# Patient Record
Sex: FEMALE | Race: WHITE
[De-identification: names, ages, dates, MRNs, and addresses within clinical notes are randomized per-mention and may not be internally consistent; named-entity substitution may affect disease eponyms.]

---

## 2018-05-09 ENCOUNTER — HOSPITAL ENCOUNTER (OUTPATIENT)
Dept: HOSPITAL 92 - SCSMRI | Age: 53
Discharge: HOME | End: 2018-05-09
Attending: FAMILY MEDICINE
Payer: COMMERCIAL

## 2018-05-09 DIAGNOSIS — G62.9: Primary | ICD-10-CM

## 2018-05-09 DIAGNOSIS — R90.82: ICD-10-CM

## 2018-05-09 DIAGNOSIS — M99.53: ICD-10-CM

## 2018-05-09 PROCEDURE — 72141 MRI NECK SPINE W/O DYE: CPT

## 2018-05-09 PROCEDURE — 72148 MRI LUMBAR SPINE W/O DYE: CPT

## 2018-05-09 PROCEDURE — 70551 MRI BRAIN STEM W/O DYE: CPT

## 2018-05-09 NOTE — MRI
BRAIN MRI WITHOUT CONTRAST:

 

Date:  05/09/18 

 

HISTORY:  

Polyneuropathy. Patient had an EGD test and failed. Evaluate for multiple sclerosis. 

 

COMPARISON:  

None. 

 

TECHNIQUE:  

Brain MRI is performed without intravenous Gadolinium administration. Multisequential, multiplanar im
aging is performed. 

 

FINDINGS:

No hemorrhage on the axial gradient echo sequence. 

 

Calvarium has a normal marrow signal intensity. Midline brain parenchymal structures are unremarkable
. 

 

Central arterial flow-voids are maintained. Absent restricted diffusion. 

 

There are T2 and FLAIR white matter hyperintensities which are nonspecific. Some of these hyperintens
ities are slightly perpendicular to the ventricular system and may represent demyelinating plagues. A
dditional hyperintensities may be due to chronic small vessel ischemic change. Correlate clinically w
ith laboratory values for multiple sclerosis. Lack of contrast limits evaluation for possible active 
demyelination. Again, there is no evidence of restricted diffusion to suggest active demyelination. N
o evidence of hydrocephalus. No parenchymal mass, mass effect, or midline shift. Brain volume is age-
appropriate. Cortical gray-white matter differentiation is preserved. 

 

IMPRESSION: 

White matter hyperintensities which are nonspecific. Possibility of demyelinating plaques cannot be e
xcluded. Clinical correlation for multiple sclerosis is recommended. 

 

 

POS: [facility]

## 2018-05-09 NOTE — MRI
LUMBAR SPINE MRI WITHOUT IV CONTRAST:

 

HISTORY:

A 52-year-old female with a history of polyneuropathy.  Failed an EGD test, with concern for multiple
 sclerosis and spinal stenosis.

 

TECHNIQUE:

Multiplanar, multisequence MRI examination of the lumbar spine is performed.

 

FINDINGS:

No abnormal marrow signal.  Mild generalized desiccation changes and ligament and facet hypertrophic 
changes.  The conus medullaris region is unremarkable, terminating at L1-L2.

 

T12-L1:  Unremarkable.

L1-L2:   Unremarkable.

L2-L3:   Unremarkable.

L3-L4:   Unremarkable.

L4-L5:   Unremarkable.

L5-S1:   Prominent left-sided facet arthrosis and ligamentous hypertrophic changes, resulting in some
 moderate to severe left foraminal stenosis and mild left lateral recess narrowing.

 

IMPRESSION:

1.  Left-sided facet hypertrophy and ligamentous hypertrophic changes with left foraminal stenosis an
d mild left lateral recess stenosis.  No evidence for other significant acute process.

 

POS: [facility]

## 2018-05-09 NOTE — MRI
MRI CERVICAL SPINE WITHOUT IV CONTRAST:

 

05/09/2018

 

HISTORY:

Polyneuropathy.

 

TECHNIQUE:

Multiplanar, multisequence MR imagines of the cervical spine are obtained without IV contrast.

 

FINDINGS:

There are a few punctate increased T2 weighted signal abnormalities seen in the right periventricular
 white matter, in the parietal region, which are incompletely evaluated on this examination.  Finding
s are better evaluated on MRI brain also obtained on this date (please see that exam for further deta
ils).

 

The cervicomedullary junction demonstrates normal signal intensity.  The spinal cord is also normal i
n contour and signal intensity.  There is motion artifact on axial images, which does limit evaluatio
n, but there is certainly no T2 signal abnormality seen on sagittal imaging.

 

C2-C3:  There is no disk bulge or disk herniation.  The central spinal canal and neural foramina are 
patent.

 

C3-C4:  There is no disk bulge or disk herniation.  The central spinal canal and neural foramina are 
patent.

 

C4-C5:  There is a mild broad-based disk osteophyte complex.  This does narrow the ventral subarachno
id space without significant mass effect on the spinal cord.  The neural foramina do appear patent at
 this level.

 

C5-C6:  There is a mild broad-based disk osteophyte complex, which again narrows the ventral subarach
noid space and encroaches on the anterior aspect of the spinal cord.  The neural foramina do appear p
atent.

 

C6-C7:  There is minimal disk bulge without significant narrowing of the central spinal canal.  The n
eural foramina are patent.

 

C7-T1:  There is no disk bulge or disk herniation.  The central spinal canal and neural foramina are 
patent.

 

The paravertebral soft tissues demonstrate a normal MRI appearance.

 

IMPRESSION:

1.  Partial visualization of signal abnormalities within the periventricular white matter, which are 
incompletely evaluated on this examination; however, MRI brain was performed on this date as well (pl
ease see that dictation for further details).

2.  No signal abnormalities are seen within the spinal cord, and the spinal cord is normal in signal 
intensity and contour.

3.  Minimal disk degenerative changes lower cervical spine.

 

POS: [facility]